# Patient Record
Sex: MALE | Race: WHITE | NOT HISPANIC OR LATINO | Employment: FULL TIME | URBAN - METROPOLITAN AREA
[De-identification: names, ages, dates, MRNs, and addresses within clinical notes are randomized per-mention and may not be internally consistent; named-entity substitution may affect disease eponyms.]

---

## 2018-07-10 ENCOUNTER — OFFICE VISIT (OUTPATIENT)
Dept: NEUROLOGY | Facility: CLINIC | Age: 33
End: 2018-07-10
Payer: COMMERCIAL

## 2018-07-10 VITALS
DIASTOLIC BLOOD PRESSURE: 88 MMHG | HEIGHT: 72 IN | HEART RATE: 62 BPM | RESPIRATION RATE: 16 BRPM | WEIGHT: 216.06 LBS | BODY MASS INDEX: 29.26 KG/M2 | SYSTOLIC BLOOD PRESSURE: 118 MMHG

## 2018-07-10 DIAGNOSIS — S06.9X1S TRAUMATIC BRAIN INJURY, WITH LOSS OF CONSCIOUSNESS OF 30 MINUTES OR LESS, SEQUELA: Primary | ICD-10-CM

## 2018-07-10 DIAGNOSIS — R41.3 MEMORY LOSS: ICD-10-CM

## 2018-07-10 DIAGNOSIS — G93.0 ARACHNOID CYST: ICD-10-CM

## 2018-07-10 PROCEDURE — 99999 PR PBB SHADOW E&M-EST. PATIENT-LVL III: CPT | Mod: PBBFAC,,, | Performed by: PSYCHIATRY & NEUROLOGY

## 2018-07-10 PROCEDURE — 99204 OFFICE O/P NEW MOD 45 MIN: CPT | Mod: S$GLB,,, | Performed by: PSYCHIATRY & NEUROLOGY

## 2018-07-10 PROCEDURE — 3008F BODY MASS INDEX DOCD: CPT | Mod: CPTII,S$GLB,, | Performed by: PSYCHIATRY & NEUROLOGY

## 2018-07-10 RX ORDER — NADOLOL 40 MG/1
40 TABLET ORAL DAILY
Refills: 5 | COMMUNITY
Start: 2018-06-11

## 2018-07-10 RX ORDER — LEVOTHYROXINE SODIUM 88 UG/1
1 TABLET ORAL DAILY
Refills: 5 | COMMUNITY
Start: 2018-06-11

## 2018-07-10 RX ORDER — FLUOXETINE HYDROCHLORIDE 20 MG/1
20 CAPSULE ORAL DAILY
Refills: 1 | COMMUNITY
Start: 2018-06-27

## 2018-07-10 RX ORDER — LORAZEPAM 0.5 MG/1
0.5 TABLET ORAL 2 TIMES DAILY PRN
Refills: 0 | COMMUNITY
Start: 2018-06-07

## 2018-07-10 RX ORDER — AMLODIPINE BESYLATE 5 MG/1
5 TABLET ORAL DAILY
Refills: 5 | COMMUNITY
Start: 2018-06-11
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
## 2018-07-10 NOTE — LETTER
July 10, 2018      Nikki Juan MD  [address]  [facility] [address]           [facility]  [address] [facility] [address]  Phone: [phone]  Fax: [phone]          Patient: Prabhu Sorenson   MR Number: [id number]   YOB: 1985   Date of Visit: 7/10/2018       Dear Dr. Nikki Juan:    Thank you for referring Prabhu Sorenson to me for evaluation. Attached you will find relevant portions of my assessment and plan of care.    If you have questions, please do not hesitate to call me. I look forward to following Prabhu Sorenson along with you.    Sincerely,    Sebastian Dean MD    Enclosure  CC:  No Recipients    If you would like to receive this communication electronically, please contact [email or web address] or [phone] to request more information on [employer] Link access.    For providers and/or their staff who would like to refer a patient to [facility], please contact us through our one-stop-shop provider referral line, [facility], at [phone].    If you feel you have received this communication in error or would no longer like to receive these types of communications, please e-mail [email or web address]

## 2018-08-16 ENCOUNTER — TELEPHONE (OUTPATIENT)
Dept: SURGERY | Facility: CLINIC | Age: 33
End: 2018-08-16

## 2018-08-16 NOTE — TELEPHONE ENCOUNTER
----- Message from Clarice Pink sent at 2018  4:12 PM CDT -----  Contact: WIFE  Prabhu Sorenson  MRN: [id number]  : 1985  PCP: Magan Lazaro  Home Phone      [phone]  Work Phone      Not on file.  Mobile          [phone]      MESSAGE: PT WOULD LIKE TO SCHEDULE APPT WITH DR ESPARZA. I AM UNABLE TO SCHEDULE ON VILMA'S SCHEDULE AND GLORIA WAS UNAVAILABLE FOR A PHONE CALL DURING THIS TIME. PLEASE CALL    PHONE: [phone]

## 2018-09-10 ENCOUNTER — OFFICE VISIT (OUTPATIENT)
Dept: SURGERY | Facility: CLINIC | Age: 33
End: 2018-09-10
Payer: COMMERCIAL

## 2018-09-10 VITALS
HEART RATE: 62 BPM | SYSTOLIC BLOOD PRESSURE: 131 MMHG | DIASTOLIC BLOOD PRESSURE: 77 MMHG | WEIGHT: 219.38 LBS | BODY MASS INDEX: 29.75 KG/M2

## 2018-09-10 DIAGNOSIS — L29.0 ANAL ITCHING: Primary | ICD-10-CM

## 2018-09-10 PROCEDURE — 46600 DIAGNOSTIC ANOSCOPY SPX: CPT | Mod: S$GLB,,, | Performed by: COLON & RECTAL SURGERY

## 2018-09-10 PROCEDURE — 3008F BODY MASS INDEX DOCD: CPT | Mod: CPTII,S$GLB,, | Performed by: COLON & RECTAL SURGERY

## 2018-09-10 PROCEDURE — 99203 OFFICE O/P NEW LOW 30 MIN: CPT | Mod: 25,S$GLB,, | Performed by: COLON & RECTAL SURGERY

## 2018-09-10 PROCEDURE — 99999 PR PBB SHADOW E&M-EST. PATIENT-LVL III: CPT | Mod: PBBFAC,,, | Performed by: COLON & RECTAL SURGERY

## 2018-09-10 RX ORDER — HYDROCORTISONE 25 MG/G
CREAM TOPICAL 3 TIMES DAILY PRN
Qty: 1 TUBE | Refills: 5 | Status: SHIPPED | OUTPATIENT
Start: 2018-09-10 | End: 2018-09-20

## 2018-09-10 NOTE — LETTER
September 16, 2018      Magan Lazaro MD  [address] [facility]  [place] [address]           [facility]  [address]  [place] [address]  Phone: [phone]          Patient: Prabhu Sorenson   MR Number: [id number]   YOB: 1985   Date of Visit: 9/10/2018       Dear Dr. Lazaro:    Thank you for referring Prabhu Sorenson to me for evaluation. Attached you will find relevant portions of my assessment and plan of care.    If you have questions, please do not hesitate to call me. I look forward to following Prabhu Sorenson along with you.    Sincerely,    Brian Guillermo MD    Enclosure  CC:  No Recipients    If you would like to receive this communication electronically, please contact [email or web address] or [phone] to request more information on [employer] Link access.    For providers and/or their staff who would like to refer a patient to [facility], please contact us through our one-stop-shop provider referral line, [facility], at [phone].    If you feel you have received this communication in error or would no longer like to receive these types of communications, please e-mail [email or web address]

## 2018-09-17 NOTE — PROGRESS NOTES
"Subjective:       Patient ID: Prabhu Sorenson is a 33 y.o. male.    Chief Complaint: Anal Itching    HPI 34 yo M with complaints of anal skin irritation for some time.  He says that he has a small "tear" in 1 consistent spot that gives him problems.  When he minimizes perianal skin irritation and trauma by using moist wipes or showering after bowel movements instead of wiping with dry toilet paper his symptoms improve.  He does report occasional spotting of blood on the toilet paper when he wipes.  He denies any pain.  He had a recent colonoscopy done by Dr. Baugh which he says did show some polyps.  He denies any significant constipation.    No family hx of CRC or IBD.      Review of patient's allergies indicates:   Allergen Reactions    Bactrim [sulfamethoxazole-trimethoprim] Other (See Comments)     Skin on tongue peeled off       Past Medical History:   Diagnosis Date    H/O cervical fracture     Hypertension     Hypothyroidism     Prostatitis        Past Surgical History:   Procedure Laterality Date    KNEE SURGERY      SHOULDER SURGERY      sinus      TONSILLECTOMY, ADENOIDECTOMY         Current Outpatient Medications   Medication Sig Dispense Refill    amLODIPine (NORVASC) 5 MG tablet Take 5 mg by mouth once daily.  5    FLUoxetine (PROZAC) 20 MG capsule Take 20 mg by mouth once daily.  1    levothyroxine (SYNTHROID) 88 MCG tablet Take 1 tablet by mouth once daily.  5    LORazepam (ATIVAN) 0.5 MG tablet Take 0.5 mg by mouth 2 (two) times daily as needed.  0    nadolol (CORGARD) 40 MG tablet Take 40 mg by mouth once daily.  5    hydrocortisone 2.5 % cream Apply topically 3 (three) times daily as needed. 1 Tube 5     No current facility-administered medications for this visit.        History reviewed. No pertinent family history.    Social History     Socioeconomic History    Marital status:      Spouse name: None    Number of children: None    Years of education: None    Highest " education level: None   Social Needs    Financial resource strain: None    Food insecurity - worry: None    Food insecurity - inability: None    Transportation needs - medical: None    Transportation needs - non-medical: None   Occupational History    None   Tobacco Use    Smoking status: Former Smoker     Packs/day: 0.50     Years: 5.00     Pack years: 2.50     Last attempt to quit: 2014     Years since quittin.1    Smokeless tobacco: Never Used   Substance and Sexual Activity    Alcohol use: Yes     Comment: socially    Drug use: No    Sexual activity: None   Other Topics Concern    None   Social History Narrative    None       Review of Systems   Constitutional: Negative for chills and fever.   HENT: Negative for congestion and sinus pressure.    Eyes: Negative for visual disturbance.   Respiratory: Negative for cough and shortness of breath.    Cardiovascular: Negative for chest pain and palpitations.   Gastrointestinal: Negative for abdominal distention, abdominal pain, anal bleeding, blood in stool, constipation, diarrhea, nausea, rectal pain and vomiting.        Anal itching   Endocrine: Negative for cold intolerance and heat intolerance.   Genitourinary: Negative for dysuria and frequency.   Musculoskeletal: Negative for arthralgias and back pain.   Skin: Negative for rash.   Allergic/Immunologic: Negative for immunocompromised state.   Neurological: Negative for dizziness, light-headedness and headaches.   Psychiatric/Behavioral: Negative for confusion. The patient is not nervous/anxious.        Objective:      Physical Exam   Constitutional: He is oriented to person, place, and time. He appears well-developed and well-nourished.   HENT:   Head: Normocephalic and atraumatic.   Eyes: Conjunctivae are normal.   Pulmonary/Chest: Effort normal. No respiratory distress.   Abdominal: Soft. He exhibits no distension and no mass. There is no tenderness. There is no rebound and no guarding.    Genitourinary:   Genitourinary Comments: Perineum - normal perianal skin, no mass, no fissure, no external hemorrhoids  MEHDI - good tone, no mass  Anoscopy - no significant internal hemorrhoids     Neurological: He is alert and oriented to person, place, and time.   Skin: Skin is warm and dry. No erythema.         Lab Results   Component Value Date    WBC 8.45 06/06/2008    HGB 16.1 06/06/2008    HCT 47.4 06/06/2008    MCV 89.3 06/06/2008     06/06/2008     BMP  Lab Results   Component Value Date     03/14/2009    K 4.3 03/14/2009     03/14/2009    CO2 30 (H) 03/14/2009    BUN 15 03/14/2009    CREATININE 1.0 03/14/2009    CALCIUM 10.2 03/14/2009     CMP  Sodium   Date Value Ref Range Status   03/14/2009 143 136 - 145 mMol/l Final     Potassium   Date Value Ref Range Status   03/14/2009 4.3 3.5 - 5.1 mMol/l Final     Chloride   Date Value Ref Range Status   03/14/2009 104 95 - 110 mMol/l Final     CO2   Date Value Ref Range Status   03/14/2009 30 (H) 23.0 - 29.0 mEq/L Final     Glucose   Date Value Ref Range Status   03/14/2009 69 (L) 70 - 110 mg/dl Final     BUN, Bld   Date Value Ref Range Status   03/14/2009 15 6 - 20 mg/dl Final     Creatinine   Date Value Ref Range Status   03/14/2009 1.0 0.5 - 1.4 mg/dl Final     Calcium   Date Value Ref Range Status   03/14/2009 10.2 8.7 - 10.5 mg/dl Final     Total Protein   Date Value Ref Range Status   03/14/2009 7.4 6.0 - 8.4 gm/dl Final     Albumin   Date Value Ref Range Status   03/14/2009 5.4 (H) 3.5 - 5.2 g/dl Final     Total Bilirubin   Date Value Ref Range Status   03/14/2009 2.2 (H) 0.1 - 1.0 mg/dl Final     Comment:     For infants and newborns, interpretation of results should be based  on gestational age, weight and in agreement with clinical  observations.  .  Premature Infant recommended reference ranges:  Up to 24 hours.............<8.0 mg/dl  Up to 48 hours............<12.0 mg/dl  3-5 days..................<15.0 mg/dl  6-29  days.................<15.0 mg/dl       Alkaline Phosphatase   Date Value Ref Range Status   03/14/2009 61 55 - 135 U/L Final     AST   Date Value Ref Range Status   03/14/2009 26 10 - 40 U/L Final     ALT   Date Value Ref Range Status   03/14/2009 24 10 - 44 U/L Final     No results found for: CEA        Assessment:       1. Anal itching    Idiopathic pruritus ani    Plan:   Discussed pruritis precautions:   -Avoid excessive trauma when cleaning after BM's - cleanse with moistened wipes, then pat dry    -Avoid excessive moisture - use cotton balls or corn starch   -Avoid external irritants such as deodorized or perfumed soaps   -Discussed possible dietary role (coffee, chocolate, alcohol, citrus, etc) - suggested stepwise elimination if above measures are not effective  RTO 6-8 weeks if no improvement.    Brian Guillermo MD, FACS, FASCRS  Senior Staff Surgeon  Department of Colon & Rectal Surgery

## 2018-12-25 ENCOUNTER — HOSPITAL ENCOUNTER (EMERGENCY)
Facility: HOSPITAL | Age: 33
Discharge: HOME | End: 2018-12-25
Attending: SURGERY
Payer: COMMERCIAL

## 2018-12-25 VITALS
BODY MASS INDEX: 29.84 KG/M2 | TEMPERATURE: 98 F | WEIGHT: 220 LBS | SYSTOLIC BLOOD PRESSURE: 142 MMHG | RESPIRATION RATE: 16 BRPM | HEART RATE: 78 BPM | DIASTOLIC BLOOD PRESSURE: 78 MMHG | OXYGEN SATURATION: 98 %

## 2018-12-25 DIAGNOSIS — N50.812 PAIN IN LEFT TESTICLE: ICD-10-CM

## 2018-12-25 DIAGNOSIS — S42.402A CLOSED FRACTURE OF LEFT ELBOW, INITIAL ENCOUNTER: Primary | ICD-10-CM

## 2018-12-25 DIAGNOSIS — M79.632 LEFT FOREARM PAIN: ICD-10-CM

## 2018-12-25 DIAGNOSIS — M25.522 LEFT ELBOW PAIN: ICD-10-CM

## 2018-12-25 PROCEDURE — 63600175 PHARM REV CODE 636 W HCPCS: Performed by: SURGERY

## 2018-12-25 PROCEDURE — 96372 THER/PROPH/DIAG INJ SC/IM: CPT

## 2018-12-25 PROCEDURE — 99284 EMERGENCY DEPT VISIT MOD MDM: CPT | Mod: 25

## 2018-12-25 RX ORDER — CYCLOBENZAPRINE HCL 10 MG
10 TABLET ORAL 3 TIMES DAILY PRN
Qty: 10 TABLET | Refills: 0 | Status: SHIPPED | OUTPATIENT
Start: 2018-12-25 | End: 2018-12-30

## 2018-12-25 RX ORDER — MORPHINE SULFATE 2 MG/ML
2 INJECTION, SOLUTION INTRAMUSCULAR; INTRAVENOUS
Status: COMPLETED | OUTPATIENT
Start: 2018-12-25 | End: 2018-12-25

## 2018-12-25 RX ORDER — ONDANSETRON 2 MG/ML
4 INJECTION INTRAMUSCULAR; INTRAVENOUS
Status: COMPLETED | OUTPATIENT
Start: 2018-12-25 | End: 2018-12-25

## 2018-12-25 RX ORDER — METHYLPREDNISOLONE 4 MG/1
TABLET ORAL
Qty: 1 PACKAGE | Refills: 0 | Status: SHIPPED | OUTPATIENT
Start: 2018-12-25

## 2018-12-25 RX ORDER — KETOROLAC TROMETHAMINE 10 MG/1
10 TABLET, FILM COATED ORAL EVERY 6 HOURS PRN
Qty: 15 TABLET | Refills: 0 | Status: SHIPPED | OUTPATIENT
Start: 2018-12-25

## 2018-12-25 RX ADMIN — ONDANSETRON 4 MG: 2 INJECTION INTRAMUSCULAR; INTRAVENOUS at 04:12

## 2018-12-25 RX ADMIN — MORPHINE SULFATE 2 MG: 2 INJECTION, SOLUTION INTRAMUSCULAR; INTRAVENOUS at 04:12

## 2018-12-25 NOTE — ED PROVIDER NOTES
[facility]                                                 Chief Complaint  33 y.o. male with Arm Injury (fell/ lt arm pain)    History of Present Illness  Prabhu Sorenson presents to the emergency room with left forearm pain  Patient had a slip and fall with left forearm pain, obvious swelling on exam  Patient on x-ray has a comminuted left radial head fracture, nondisplaced  Patient has good range of motion good distal pulses noted on evaluation    The history is provided by the patient   device was not used during this ER visit  Medical history: Cervical fracture, hypothyroidism, prostatitis, hypertension  Surgeries: Knee surgery, shoulder surgery, tonsillectomy, adenoidectomy  Allergies: Bactrim    Review of Systems and Physical Exam      Review of Systems  -- Constitution - no fever, denies fatigue, no weakness, no chills  -- Eyes - no tearing or redness, no visual disturbance  -- Ear, Nose - no tinnitus or earache, no nasal congestion or discharge  -- Mouth,Throat - no sore throat, no toothache, normal voice, normal swallowing  -- Respiratory - denies cough and congestion, no shortness of breath, no MIKE  -- Cardiovascular - denies chest pain, no palpitations, denies claudication  -- Gastrointestinal - denies abdominal pain, nausea, vomiting, or diarrhea  -- Genitourinary - no dysuria, no hematuria, no flank pain, no bladder pain  -- Musculoskeletal - left arm and elbow pain after a fall today  -- Neurological - no headache, denies weakness or seizure; no LOC  -- Skin - denies pallor, rash, or changes in skin. no hives or welts noted    Vital Signs  His oral temperature is 98 °F (36.7 °C).   His blood pressure is 156/85 and his pulse is 84.   His respiration is 16 and oxygen saturation is 98%.     Physical Exam  -- Nursing note and vitals reviewed  -- Constitutional: Appears well-developed and well-nourished  -- Head: Atraumatic. Normocephalic. No obvious  abnormality  -- Eyes: Pupils are equal and reactive to light. Normal conjunctiva and lids  -- Cardiac: Normal rate, regular rhythm and normal heart sounds  -- Pulmonary: Normal respiratory effort, breath sounds clear to auscultation  -- Abdominal: Soft, no tenderness. Normal bowel sounds. Normal liver edge  -- Musculoskeletal:  Left elbow pain and swelling on range of motion  -- Neurological: No focal deficits. Showed good interaction with staff  -- Skin: Warm and dry. No evidence of rash or cellulitis    Emergency Room Course      Radiology  Radial head fracture, detailed above     Medications Given  morphine injection 2 mg (2 mg Intramuscular Given 12/25/18 1600)   ondansetron injection 4 mg (4 mg Intramuscular Given 12/25/18 1600)     Additional treatment  -- Sling and swathe placed on the affected limb by the CNA for comfort and decreased pain     Diagnosis  -- The primary encounter diagnosis was Closed fracture of left elbow, initial encounter.   -- Diagnoses of Left elbow pain and Left forearm pain were also pertinent to this visit.    Disposition and Plan  -- Disposition: home  -- Condition: stable  -- Follow-up: Patient to follow up with Magan Lazaro MD in 1-2 days.  -- I advised the patient that we have found no life threatening condition today  -- At this time, I believe the patient is clinically stable for discharge.   -- The patient acknowledges that close follow up with a MD is required   -- Patient agrees to comply with all instruction and direction given in the ER    This note is dictated on M*Modal word recognition program.  There are word recognition mistakes that are occasionally missed on review.          Raheem Barraza MD  12/25/18 9115

## 2019-06-13 DIAGNOSIS — M47.812 OSTEOARTHRITIS OF CERVICAL SPINE, UNSPECIFIED SPINAL OSTEOARTHRITIS COMPLICATION STATUS: ICD-10-CM

## 2019-06-13 DIAGNOSIS — M54.2 CERVICALGIA: ICD-10-CM

## 2019-06-13 RX ORDER — IBUPROFEN AND FAMOTIDINE 800; 26.6 MG/1; MG/1
TABLET, COATED ORAL
Qty: 90 TABLET | Refills: 3 | OUTPATIENT
Start: 2019-06-13